# Patient Record
Sex: FEMALE | Race: WHITE | NOT HISPANIC OR LATINO | Employment: FULL TIME | RURAL
[De-identification: names, ages, dates, MRNs, and addresses within clinical notes are randomized per-mention and may not be internally consistent; named-entity substitution may affect disease eponyms.]

---

## 2018-07-17 ENCOUNTER — OFFICE VISIT (OUTPATIENT)
Dept: OTOLARYNGOLOGY | Facility: CLINIC | Age: 53
End: 2018-07-17

## 2018-07-17 VITALS — BODY MASS INDEX: 32.76 KG/M2 | HEIGHT: 62 IN | OXYGEN SATURATION: 96 % | WEIGHT: 178 LBS

## 2018-07-17 DIAGNOSIS — J31.0 CHRONIC RHINITIS, UNSPECIFIED TYPE: ICD-10-CM

## 2018-07-17 DIAGNOSIS — J34.2 NASAL SEPTAL DEFORMITY: ICD-10-CM

## 2018-07-17 DIAGNOSIS — R51.9 NONINTRACTABLE EPISODIC HEADACHE, UNSPECIFIED HEADACHE TYPE: Primary | ICD-10-CM

## 2018-07-17 PROCEDURE — 31231 NASAL ENDOSCOPY DX: CPT | Performed by: OTOLARYNGOLOGY

## 2018-07-17 PROCEDURE — 99243 OFF/OP CNSLTJ NEW/EST LOW 30: CPT | Performed by: OTOLARYNGOLOGY

## 2018-07-17 RX ORDER — ROSUVASTATIN CALCIUM 10 MG/1
TABLET, COATED ORAL
COMMUNITY

## 2018-07-17 RX ORDER — VALSARTAN AND HYDROCHLOROTHIAZIDE 80; 12.5 MG/1; MG/1
TABLET, FILM COATED ORAL
COMMUNITY

## 2018-07-17 RX ORDER — ESOMEPRAZOLE MAGNESIUM 40 MG/1
CAPSULE, DELAYED RELEASE ORAL
COMMUNITY

## 2018-07-17 RX ORDER — PROPRANOLOL HYDROCHLORIDE 40 MG/1
TABLET ORAL EVERY 12 HOURS SCHEDULED
COMMUNITY

## 2018-07-17 RX ORDER — MONTELUKAST SODIUM 10 MG/1
TABLET ORAL
COMMUNITY

## 2018-07-17 RX ORDER — RIZATRIPTAN BENZOATE 10 MG/1
TABLET ORAL
COMMUNITY

## 2018-07-17 RX ORDER — ESCITALOPRAM OXALATE 10 MG/1
TABLET ORAL
COMMUNITY

## 2018-07-17 RX ORDER — IBUPROFEN 800 MG/1
TABLET ORAL EVERY 8 HOURS SCHEDULED
COMMUNITY

## 2018-07-18 NOTE — PROGRESS NOTES
"Subjective   Viviane Hinton is a 52 y.o. female.   This is a consultation from   History of Present Illness   Patient reports that she has been diagnosed with recurring sinus infections.  Previously saw Dr. Mcbride.  She states Dr. Mcbride gave her \"4 rounds of Levaquin\" for presumed sinusitis.  However in talking to her primary symptom is a right sided supraorbital/frontal headache.  She was having the headaches on a daily basis she now has them approximately one time a week.  Last antibiotics were about 3 months ago.  She says she has some occasional clear rhinorrhea and does cough up mucus at times but does not have any purulent rhinorrhea.  She was previously prescribed Flonase but is not using it now.  No previous nasal surgery or injury.      The following portions of the patient's history were reviewed and updated as appropriate: allergies, current medications, past family history, past medical history, past social history, past surgical history and problem list.      Viviane Hinton reports that she has never smoked. She has never used smokeless tobacco.  Patient is not a tobacco user and has not been counseled for use of tobacco products    Family History   Problem Relation Age of Onset   • Cancer Mother        Allergies no known allergies      Current Outpatient Prescriptions:   •  Cetirizine-Pseudoephedrine (ZYRTEC-D PO), Every 12 (Twelve) Hours., Disp: , Rfl:   •  escitalopram (LEXAPRO) 10 MG tablet, escitalopram 10 mg tablet  Take 1 tablet every day by oral route for 30 days., Disp: , Rfl:   •  esomeprazole (nexIUM) 40 MG capsule, esomeprazole magnesium 40 mg capsule,delayed release  Take 1 capsule every day by oral route for 90 days., Disp: , Rfl:   •  estrogens, conjugated, (PREMARIN) 0.625 MG tablet, Premarin 0.625 mg tablet  Take 1 tablet every day by oral route., Disp: , Rfl:   •  ibuprofen (ADVIL,MOTRIN) 800 MG tablet, Every 8 (Eight) Hours., Disp: , Rfl:   •  montelukast (SINGULAIR) " 10 MG tablet, montelukast 10 mg tablet  TAKE 1 TABLET ONCE DAILY, Disp: , Rfl:   •  propranolol (INDERAL) 40 MG tablet, Every 12 (Twelve) Hours., Disp: , Rfl:   •  rizatriptan (MAXALT) 10 MG tablet, rizatriptan 10 mg tablet  Take 1 tablet by oral route as directed., Disp: , Rfl:   •  rosuvastatin (CRESTOR) 10 MG tablet, rosuvastatin 10 mg tablet  Take 1 tablet every day by oral route for 90 days., Disp: , Rfl:   •  valsartan-hydrochlorothiazide (DIOVAN-HCT) 80-12.5 MG per tablet, valsartan 80 mg-hydrochlorothiazide 12.5 mg tablet  TAKE 1 TABLET ONCE DAILY, Disp: , Rfl:     Past Medical History:   Diagnosis Date   • High blood pressure          Review of Systems   HENT: Positive for trouble swallowing.    Respiratory: Positive for cough.    Endocrine: Positive for heat intolerance.   All other systems reviewed and are negative.          Objective   Physical Exam  General: Well-developed well-nourished female in no acute distress.  Alert and oriented ×3. Head: Normocephalic. Face: Symmetrical strength and appearance. PERRL. EOMI. Voice:Strong. Speech:Fluent  Ears: External ears no deformity, canals no discharge, tympanic membranes intact clear and mobile bilaterally.  Nose: Nares show no discharge mass polyp or purulence.  Boggy mucosa is present.  No gross external deformity.  Septum: To the left  Oral cavity: Lips and gums without lesions.  Tongue and floor of mouth without lesions.  Parotid and submandibular ducts unobstructed.  No mucosal lesions on the buccal mucosa or vestibule of the mouth.  Pharynx: No erythema exudate mass or ulcer  Neck: No lymphadenopathy.  No thyromegaly.  Trachea and larynx midline.  No masses in the parotid or submandibular glands.    Nasal endoscopy is performed: Valentín-Synephrine and Xylocaine are instilled the nares bilaterally.  0° scope is passed into each nostril.  The inferior, middle, and superior turbinates as well as nasal septum and nasopharynx are examined.  Pertinent findings  include: Septal deformity to the left with mildly boggy mucosa throughout the nose but no evidence of discharge, mass, polyp, purulence in either middle meatal cleft.  No masses in the nasopharynx.    Patient had a CT scan performed of her sinuses at [facility].  The report but not the images are available for review but the report states all of the paranasal sinuses were negative for any evidence of infection or inflammation or obstruction.    Assessment/Plan   Viviane was seen today for sinus problem.    Diagnoses and all orders for this visit:    Nonintractable episodic headache, unspecified headache type    Nasal septal deformity    Chronic rhinitis, unspecified type      Plan: Explained to the patient that her headaches are not sinal nasal in origin.  Since they are improving I would just recommend she discuss this further with  to see what additional treatments or evaluation would be indicated.  If she is having nasal symptoms of clear rhinorrhea she can restart her Flonase or use an over-the-counter antihistamine such as Claritin or Zyrtec as needed.  I'll be glad to see her again as needed    To  for this consultation